# Patient Record
Sex: MALE
[De-identification: names, ages, dates, MRNs, and addresses within clinical notes are randomized per-mention and may not be internally consistent; named-entity substitution may affect disease eponyms.]

---

## 2019-10-10 ENCOUNTER — HOSPITAL ENCOUNTER (OUTPATIENT)
Dept: HOSPITAL 92 - BICCT | Age: 54
Discharge: HOME | End: 2019-10-10
Attending: INTERNAL MEDICINE
Payer: COMMERCIAL

## 2019-10-10 DIAGNOSIS — K62.89: Primary | ICD-10-CM

## 2019-10-10 DIAGNOSIS — I25.10: ICD-10-CM

## 2019-10-10 DIAGNOSIS — R74.8: ICD-10-CM

## 2019-10-10 DIAGNOSIS — K42.9: ICD-10-CM

## 2019-10-10 DIAGNOSIS — Q27.8: ICD-10-CM

## 2019-10-10 DIAGNOSIS — K40.90: ICD-10-CM

## 2019-10-10 PROCEDURE — 74177 CT ABD & PELVIS W/CONTRAST: CPT

## 2019-10-10 NOTE — CT
CT ABDOMEN AND PELVIS WITH IV CONTRAST:

 

10/10/2019

 

PROVIDED CLINICAL HISTORY:

Anorectal pain. Abnormal liver enzymes.

 

FINDINGS:

The visualized lung bases are free of significant opacity. Vascular calcification, including coronary
 calcium, is demonstrated.

 

The liver, spleen, pancreas, kidneys and adrenal glands demonstrate a normal CT appearance.

 

There is no bowel dilatation, inflammatory fat stranding, free fluid or lymph node enlargement appare
nt. The appendix appears normal.

 

Small fat-containing umbilical hernia. Small fat-containing left inguinal hernia.

 

The osseous structures demonstrate no concerning lytic or blastic lesions. There are asymmetric promi
nent vascular structures present involving the left proximal inner thigh, posteriorly, incompletely v
isualized and associated with several phleboliths, compatible with a venous malformation.

 

IMPRESSION:

1. Atherosclerosis including coronary calcium.

 

2. Partially visualized venous malformation involving the left posterior proximal thigh.

 

3. Small fat-containing umbilical and left inguinal hernias.

 

POS: [facility]

## 2021-04-22 ENCOUNTER — HOSPITAL ENCOUNTER (OUTPATIENT)
Dept: HOSPITAL 92 - BICULT | Age: 56
Discharge: HOME | End: 2021-04-22
Attending: FAMILY MEDICINE
Payer: COMMERCIAL

## 2021-04-22 DIAGNOSIS — R16.0: ICD-10-CM

## 2021-04-22 DIAGNOSIS — K80.20: ICD-10-CM

## 2021-04-22 DIAGNOSIS — K76.0: Primary | ICD-10-CM

## 2021-04-22 PROCEDURE — 93975 VASCULAR STUDY: CPT

## 2021-04-29 ENCOUNTER — HOSPITAL ENCOUNTER (OUTPATIENT)
Dept: HOSPITAL 92 - CSHLAB | Age: 56
Discharge: HOME | End: 2021-04-29
Attending: INTERNAL MEDICINE
Payer: COMMERCIAL

## 2021-04-29 DIAGNOSIS — R94.5: ICD-10-CM

## 2021-04-29 DIAGNOSIS — Z20.822: ICD-10-CM

## 2021-04-29 DIAGNOSIS — R79.0: ICD-10-CM

## 2021-04-29 DIAGNOSIS — K62.89: ICD-10-CM

## 2021-04-29 DIAGNOSIS — Z01.812: Primary | ICD-10-CM

## 2021-04-29 LAB
ALBUMIN SERPL BCG-MCNC: 4.4 G/DL (ref 3.5–5)
ALP SERPL-CCNC: 98 U/L (ref 40–110)
ALT SERPL W P-5'-P-CCNC: 75 U/L (ref 8–55)
AST SERPL-CCNC: 42 U/L (ref 5–34)
BASOPHILS # BLD AUTO: 0.1 10X3/UL (ref 0–0.2)
BASOPHILS NFR BLD AUTO: 1 % (ref 0–2)
BILIRUB DIRECT SERPL-MCNC: 0.2 MG/DL (ref 0.1–0.3)
BILIRUB SERPL-MCNC: 0.4 MG/DL (ref 0.2–1.2)
EOSINOPHIL # BLD AUTO: 0.1 10X3/UL (ref 0–0.5)
EOSINOPHIL NFR BLD AUTO: 1.4 % (ref 0–6)
HGB BLD-MCNC: 15.2 G/DL (ref 13.5–17.5)
INR PPP: 1
LYMPHOCYTES NFR BLD AUTO: 23.8 % (ref 18–47)
MCH RBC QN AUTO: 34.2 PG (ref 27–33)
MCV RBC AUTO: 99.3 FL (ref 81.2–95.1)
MONOCYTES # BLD AUTO: 0.7 10X3/UL (ref 0–1.1)
MONOCYTES NFR BLD AUTO: 9.9 % (ref 0–10)
NEUTROPHILS # BLD AUTO: 4.5 10X3/UL (ref 1.5–8.4)
NEUTROPHILS NFR BLD AUTO: 63.3 % (ref 40–75)
PLATELET # BLD AUTO: 179 10X3/UL (ref 150–450)
PROTHROMBIN TIME: 10.9 SEC (ref 9.5–12.1)
RBC # BLD AUTO: 4.45 10X6/UL (ref 4.32–5.72)
WBC # BLD AUTO: 7.1 10X3/UL (ref 3.5–10.5)

## 2021-04-29 PROCEDURE — 85610 PROTHROMBIN TIME: CPT

## 2021-04-29 PROCEDURE — U0005 INFEC AGEN DETEC AMPLI PROBE: HCPCS

## 2021-04-29 PROCEDURE — U0003 INFECTIOUS AGENT DETECTION BY NUCLEIC ACID (DNA OR RNA); SEVERE ACUTE RESPIRATORY SYNDROME CORONAVIRUS 2 (SARS-COV-2) (CORONAVIRUS DISEASE [COVID-19]), AMPLIFIED PROBE TECHNIQUE, MAKING USE OF HIGH THROUGHPUT TECHNOLOGIES AS DESCRIBED BY CMS-2020-01-R: HCPCS

## 2021-04-29 PROCEDURE — 87635 SARS-COV-2 COVID-19 AMP PRB: CPT

## 2021-04-29 PROCEDURE — 80076 HEPATIC FUNCTION PANEL: CPT

## 2021-04-29 PROCEDURE — 85025 COMPLETE CBC W/AUTO DIFF WBC: CPT

## 2021-04-29 PROCEDURE — 82607 VITAMIN B-12: CPT

## 2021-04-29 PROCEDURE — 82746 ASSAY OF FOLIC ACID SERUM: CPT

## 2021-05-04 ENCOUNTER — HOSPITAL ENCOUNTER (OUTPATIENT)
Dept: HOSPITAL 92 - CSHULT | Age: 56
Discharge: HOME | End: 2021-05-04
Attending: INTERNAL MEDICINE
Payer: COMMERCIAL

## 2021-05-04 VITALS — BODY MASS INDEX: 25.8 KG/M2

## 2021-05-04 DIAGNOSIS — K62.89: ICD-10-CM

## 2021-05-04 DIAGNOSIS — R94.5: Primary | ICD-10-CM

## 2021-05-04 DIAGNOSIS — R79.0: ICD-10-CM

## 2021-05-04 PROCEDURE — 76942 ECHO GUIDE FOR BIOPSY: CPT

## 2021-05-04 PROCEDURE — 88313 SPECIAL STAINS GROUP 2: CPT

## 2021-05-04 PROCEDURE — 88307 TISSUE EXAM BY PATHOLOGIST: CPT

## 2021-05-04 PROCEDURE — 47000 NEEDLE BIOPSY OF LIVER PERQ: CPT
